# Patient Record
Sex: MALE | Race: WHITE | Employment: OTHER | URBAN - METROPOLITAN AREA
[De-identification: names, ages, dates, MRNs, and addresses within clinical notes are randomized per-mention and may not be internally consistent; named-entity substitution may affect disease eponyms.]

---

## 2020-02-20 ENCOUNTER — HOSPITAL ENCOUNTER (OUTPATIENT)
Dept: CT IMAGING | Age: 70
Discharge: HOME | End: 2020-02-20
Payer: MEDICARE

## 2020-02-20 DIAGNOSIS — M62.830 BACK MUSCLE SPASM: ICD-10-CM

## 2020-02-20 DIAGNOSIS — M40.209 ACQUIRED HUNCHBACK: ICD-10-CM

## 2020-02-20 DIAGNOSIS — M25.78 SPINAL OSTEOPHYTOSIS: ICD-10-CM

## 2020-02-20 DIAGNOSIS — M50.30 DEGENERATION OF CERVICAL INTERVERTEBRAL DISC: ICD-10-CM

## 2020-02-20 PROCEDURE — 72125 CT NECK SPINE W/O DYE: CPT
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
## 2020-03-09 ENCOUNTER — OFFICE VISIT (OUTPATIENT)
Dept: ORTHOPEDIC SURGERY | Age: 70
End: 2020-03-09

## 2020-03-09 VITALS
TEMPERATURE: 98 F | BODY MASS INDEX: 23.76 KG/M2 | OXYGEN SATURATION: 97 % | HEART RATE: 72 BPM | DIASTOLIC BLOOD PRESSURE: 78 MMHG | SYSTOLIC BLOOD PRESSURE: 119 MMHG | HEIGHT: 72 IN | WEIGHT: 175.4 LBS

## 2020-03-09 DIAGNOSIS — M62.838 MUSCLE SPASM: ICD-10-CM

## 2020-03-09 DIAGNOSIS — Z95.0 S/P PLACEMENT OF CARDIAC PACEMAKER: ICD-10-CM

## 2020-03-09 DIAGNOSIS — M48.02 FORAMINAL STENOSIS OF CERVICAL REGION: Primary | ICD-10-CM

## 2020-03-09 DIAGNOSIS — M79.18 MYOFASCIAL PAIN: ICD-10-CM

## 2020-03-09 DIAGNOSIS — M47.812 CERVICAL FACET JOINT SYNDROME: ICD-10-CM

## 2020-03-09 RX ORDER — DEXTROAMPHETAMINE SACCHARATE, AMPHETAMINE ASPARTATE MONOHYDRATE, DEXTROAMPHETAMINE SULFATE AND AMPHETAMINE SULFATE 5; 5; 5; 5 MG/1; MG/1; MG/1; MG/1
20 CAPSULE, EXTENDED RELEASE ORAL DAILY
COMMUNITY
Start: 2020-03-04

## 2020-03-09 RX ORDER — PRAZOSIN HYDROCHLORIDE 5 MG/1
CAPSULE ORAL
COMMUNITY
Start: 2020-01-31

## 2020-03-09 RX ORDER — METHYLPREDNISOLONE 4 MG/1
TABLET ORAL
Qty: 1 DOSE PACK | Refills: 0 | Status: SHIPPED | OUTPATIENT
Start: 2020-03-09 | End: 2020-04-27 | Stop reason: ALTCHOICE

## 2020-03-09 RX ORDER — METHOCARBAMOL 500 MG/1
TABLET, FILM COATED ORAL
Qty: 60 TAB | Refills: 1 | Status: SHIPPED | OUTPATIENT
Start: 2020-03-09 | End: 2020-04-27 | Stop reason: SDUPTHER

## 2020-03-09 RX ORDER — IBUPROFEN 600 MG/1
TABLET ORAL
Qty: 60 TAB | Refills: 1 | Status: SHIPPED | OUTPATIENT
Start: 2020-03-09

## 2020-03-09 NOTE — PATIENT INSTRUCTIONS
Neck Arthritis: Exercises Introduction Here are some examples of exercises for you to try. The exercises may be suggested for a condition or for rehabilitation. Start each exercise slowly. Ease off the exercises if you start to have pain. You will be told when to start these exercises and which ones will work best for you. How to do the exercises Neck stretches to the side 1. This stretch works best if you keep your shoulder down as you lean away from it. To help you remember to do this, start by relaxing your shoulders and lightly holding on to your thighs or your chair. 2. Tilt your head toward your shoulder and hold for 15 to 30 seconds. Let the weight of your head stretch your muscles. 3. Repeat 2 to 4 times toward each shoulder. Chin tuck 1. Lie on the floor with a rolled-up towel under your neck. Your head should be touching the floor. 2. Slowly bring your chin toward your chest. 
3. Hold for a count of 6, and then relax for up to 10 seconds. 4. Repeat 8 to 12 times. Active cervical rotation 1. Sit in a firm chair, or stand up straight. 2. Keeping your chin level, turn your head to the right, and hold for 15 to 30 seconds. 3. Turn your head to the left and hold for 15 to 30 seconds. 4. Repeat 2 to 4 times to each side. Shoulder blade squeeze 1. While standing, squeeze your shoulder blades together. 2. Do not raise your shoulders up as you are squeezing. 3. Hold for 6 seconds. 4. Repeat 8 to 12 times. Shoulder rolls 1. Sit comfortably with your feet shoulder-width apart. You can also do this exercise standing up. 2. Roll your shoulders up, then back, and then down in a smooth, circular motion. 3. Repeat 2 to 4 times. Follow-up care is a key part of your treatment and safety. Be sure to make and go to all appointments, and call your doctor if you are having problems. It's also a good idea to know your test results and keep a list of the medicines you take. Where can you learn more? Go to http://sy-clay.info/. Enter B053 in the search box to learn more about \"Neck Arthritis: Exercises. \" Current as of: June 26, 2019 Content Version: 12.2 © 6968-1133 [employer]. Care instructions adapted under license by [employer] (which disclaims liability or warranty for this information). If you have questions about a medical condition or this instruction, always ask your healthcare professional. [place] [address] any warranty or liability for your use of this information.

## 2020-03-09 NOTE — LETTER
3/10/20 Patient: Amanda Casillas YOB: 1950 Date of Visit: 3/9/2020 Ned Tarango MD 
[address] [place] [address] VIA Facsimile: [phone] Dear Ned Tarango MD, Thank you for referring Mr. Han Brown to [address] [place] [address] for evaluation. My notes for this consultation are attached. If you have questions, please do not hesitate to call me. I look forward to following your patient along with you. Sincerely, Derick Romero MD

## 2020-04-27 ENCOUNTER — VIRTUAL VISIT (OUTPATIENT)
Dept: ORTHOPEDIC SURGERY | Age: 70
End: 2020-04-27

## 2020-04-27 DIAGNOSIS — M62.838 MUSCLE SPASM: ICD-10-CM

## 2020-04-27 DIAGNOSIS — Z95.0 S/P PLACEMENT OF CARDIAC PACEMAKER: ICD-10-CM

## 2020-04-27 DIAGNOSIS — M79.18 MYOFASCIAL PAIN: ICD-10-CM

## 2020-04-27 DIAGNOSIS — M47.812 CERVICAL FACET JOINT SYNDROME: ICD-10-CM

## 2020-04-27 DIAGNOSIS — M48.02 FORAMINAL STENOSIS OF CERVICAL REGION: Primary | ICD-10-CM

## 2020-04-27 NOTE — PROGRESS NOTES
[facility]  [address] [place], [facility] [address]  Phone: [phone]  Fax: [phone]    Pt's YOB: 1950    ASSESSMENT   Diagnoses and all orders for this visit:    1. Foraminal stenosis of cervical region    2. Muscle spasm  -     methocarbamoL (ROBAXIN) 500 mg tablet; Take 1 tab by mouth BID-TID as needed for muscle spasm. 3. Cervical facet joint syndrome    4. Myofascial pain    5. S/P placement of cardiac pacemaker         IMPRESSION AND PLAN:  Jasper Guan is a 77 y.o. right hand dominant male with history of cervical pain. Pt reports intermittent dull neck pain but notes improvement since he took a Medrol Dosepak. He was unable to attend physical therapy due to COVID-19 precautions. Pt takes ibuprofen 600 mg and Robaxin 500 mg as needed with benefit    1) Pt was given information on cervical arthritis exercises. 2) He will continue taking ibuprofen as prescribed and does not need a refill at this time-- he uses this intermittently as needed due to history of gi bleed. 3) Pt received a refill of Robaxin 500 mg 1 tab BID-TID prn muscle spasm. 4) Discussed restarting physical therapy once facilities reopen. 5) Mr. Stanislav Shell has a reminder for a \"due or due soon\" health maintenance. I have asked that he contact his primary care provider, Jessica Greenfield MD, for follow-up on this health maintenance. 6)  demonstrated consistency with prescribing. Follow-up and Dispositions    · Return in about 4 weeks (around 5/25/2020) for Medication follow up virtual visit to set up therapy. CPT Codes 28617-83034 for Established Patients may apply to this TeleHealth Visit  Time-based coding, delete if not needed: I spent at least 15 minutes with this established patient, and >50% of the time was spent counseling and/or coordinating care regarding his symptoms, physical therapy, and medications.      Due to this being a TeleHealth evaluation, many elements of the physical examination are unable to be assessed. Pursuant to the emergency declaration under the [facility], [facility] waiver authority and the [employer] Act, this Virtual Visit was conducted, with patient's consent, to reduce the patient's risk of exposure to COVID-19 and provide continuity of care for an established patient. Services were provided through a video synchronous discussion virtually to substitute for in-person clinic visit. HISTORY OF PRESENT ILLNESS:  Trinidad Donovan is a 77 y.o. right hand dominant male with history of cervical pain and is seen from his home by synchronous (real-time) audio-video technology at the [facility] location via doxy. me on 4/27/2020 with his verbal consent for follow up. Pt reports intermittent dull neck pain but notes improvement since he took a Medrol Dosepak. He denies any pain extending onto the arms at this time. Pt reports that he previously experienced burning lava like pain radiating down the arms and legs after increased activity but this has recently improved. He attended the evaluation appointment for physical therapy but was never able to start sessions since his last office visit due to COVID-19 precautions. Pt notes that he purchased a TheraCane since his last office visit. Pt takes ibuprofen 600 mg and Robaxin 500 mg as needed with benefit. He denies any sedation with his medications. Pt reports a history of 2 bleeding ulcers so he generally avoids taking the ibuprofen as tolerated. Of note, he is not a candidate for an MRI since he has a pacemaker. Pt at this time desires to continue with current care.      Pain Scale: 1/10    PCP: Leila Panchal MD     Past Medical History:   Diagnosis Date    Abdominal bloating     Anxiety     Benign prostatic hyperplasia with lower urinary tract symptoms     BPH with obstruction/lower urinary tract symptoms     BPH without obstruction/lower urinary tract symptoms     Bradycardia     Cardiac arrhythmia     Depression     Dysuria     ED (erectile dysfunction)     Elevated PSA     Erectile dysfunction of organic origin     Fecal incontinence due to anorectal disorder     Glaucoma 2012    H/O hernia repair     Heart attack ([facility] [address]     2014    Hematuria     Hernia, umbilical     History of shingles 1985    Hypercholesterolemia     Incomplete bladder emptying     MS (multiple sclerosis) ([facility])     Nonvenomous insect bite of penis     OAB (overactive bladder)     Other urethral stricture, male, meatal     Peyronie disease     Retinal tear 2012    both eyes    Short-term memory loss     Urinary hesitancy     Urinary incontinence     Urinary retention     Urinary stream slowing     Weak urine stream         Social History     Socioeconomic History    Marital status: UNKNOWN     Spouse name: Not on file    Number of children: Not on file    Years of education: Not on file    Highest education level: Not on file   Occupational History    Not on file   Social Needs    Financial resource strain: Not on file    Food insecurity     Worry: Not on file     Inability: Not on file   [employer] needs     Medical: Not on file     Non-medical: Not on file   Tobacco Use    Smoking status: Former Smoker     Years: 1.00     Types: Cigarettes     Last attempt to quit: 1971     Years since quittin.3    Smokeless tobacco: Never Used   Substance and Sexual Activity    Alcohol use: Yes     Alcohol/week: 0.0 standard drinks     Comment: Occassionally.      Drug use: No    Sexual activity: Yes   Lifestyle    Physical activity     Days per week: Not on file     Minutes per session: Not on file    Stress: Not on file   Relationships    Social connections     Talks on phone: Not on file     Gets together: Not on file     Attends Yarsanism service: Not on file Active member of club or organization: Not on file     Attends meetings of clubs or organizations: Not on file     Relationship status: Not on file    Intimate partner violence     Fear of current or ex partner: Not on file     Emotionally abused: Not on file     Physically abused: Not on file     Forced sexual activity: Not on file   Other Topics Concern    Not on file   Social History Narrative    Not on file       Current Outpatient Medications   Medication Sig Dispense Refill    methocarbamoL (ROBAXIN) 500 mg tablet Take 1 tab by mouth BID-TID as needed for muscle spasm. 60 Tab 1    amphetamine-dextroamphetamine XR (ADDERALL XR) 20 mg XR capsule Take 20 mg by mouth daily.  prazosin (MINIPRESS) 5 mg capsule TK 1 C PO HS      ibuprofen (MOTRIN) 600 mg tablet Take 1 tab by mouth two (2) times daily as needed with food 60 Tab 1    sildenafil, pulm. hypertension, (REVATIO) 20 mg tablet Take 1-5 tabs by mouth one hour prior to sexual activity 90 Tab 0    fish oil-omega-3 fatty acids 340-1,000 mg capsule Take 1 Cap by mouth daily.  DULoxetine (CYMBALTA) 60 mg capsule Take 60 mg by mouth daily. No Known Allergies      REVIEW OF SYSTEMS    Constitutional: Negative for fever, chills, or weight change. Gastrointestinal: Negative for acid reflux, change in bowel habits, or constipation. Musculoskeletal: Positive for cervical pain. Neurological: Negative for headaches, dizziness, or increased numbness. Psychiatric/Behavioral: Positive for difficulty with sleep. IMAGING:    Cervical spine CT from 02/20/2020 was personally reviewed with the patient and demonstrated:       Results from [facility] on 02/20/20   CT SPINE CERV WO CONT     Narrative EXAM: CT SPINE CERV WO CONT     CLINICAL INDICATION/HISTORY: Spinal osteophytosis     COMPARISON: None.     TECHNIQUE: CT of the cervical spine without intravenous contrast administration.    Coronal and sagittal reformats were generated and reviewed. One or more dose  reduction techniques were used on this CT: automated exposure control,  adjustment of the mAs and/or kVp according to patient size, and iterative  reconstruction techniques. The specific techniques used on this CT exam have  been documented in the patient's electronic medical record. Digital Imaging and  Communications in Medicine (DICOM) format image data are available to  nonaffiliated external healthcare facilities or entities on a secure, media  free, reciprocally searchable basis with patient authorization for at least a  12-month period after this study.        _______________        FINDINGS:     VERTEBRAE AND DISCS: Straightening of the normal cervical lordosis. Multilevel  degenerative disc disease with endplate sclerosis and spurring, most prominent  at C5-C6 and C6-C7. Mild multilevel facet arthrosis is also noted. Vertebral  body heights are preserved. The dens and atlanto-axial axis are intact. The  posterior elements are intact. No fracture or subluxation. No aggressive  osseous lesions.     SPINAL CANAL AND FORAMINA:     The spinal canal and neural foramina are maintained at C2-C3.     C3-C4: Uncovertebral hypertrophy and facet arthropathy to the left results in  moderate to high-grade foraminal encroachment. Canal and right neural foramina  are patent.     C4-C5: Combination no significant canal or facet of facet arthrosis and  uncovertebral hypertrophy results in mild bilateral foraminal encroachment. Canal is maintained.     C5-C6: Uncovertebral hypertrophy and facet arthrosis contribute to mild  foraminal encroachment bilaterally.     C6-C7: Uncovertebral hypertrophy and facet arthrosis result in moderate right  greater than left foraminal encroachment.  No high-grade canal stenosis.     C7-T1: No significant canal or foraminal encroachment.     PREVERTEBRAL SOFT TISSUES: Normal.     VISIBLE PORTIONS OF POSTERIOR FOSSA/BRAIN: Unremarkable.     LUNG APICES: Unremarkable.     OTHER: None.     _______________        Impression IMPRESSION:     Multilevel cervical spondylosis, most significant at C3-C4 where there is  moderate to high-grade left-sided foraminal encroachment, possible source of C4  nerve impingement. No acute findings or other significant areas of stenosis. Written by Lisbet De Oliveira, as dictated by Reginald Styles MD.  I, Dr. Reginald Styles confirm that all documentation is accurate.

## 2020-04-27 NOTE — PATIENT INSTRUCTIONS
Neck Arthritis: Exercises Introduction Here are some examples of exercises for you to try. The exercises may be suggested for a condition or for rehabilitation. Start each exercise slowly. Ease off the exercises if you start to have pain. You will be told when to start these exercises and which ones will work best for you. How to do the exercises Neck stretches to the side 1. This stretch works best if you keep your shoulder down as you lean away from it. To help you remember to do this, start by relaxing your shoulders and lightly holding on to your thighs or your chair. 2. Tilt your head toward your shoulder and hold for 15 to 30 seconds. Let the weight of your head stretch your muscles. 3. Repeat 2 to 4 times toward each shoulder. Chin tuck 1. Lie on the floor with a rolled-up towel under your neck. Your head should be touching the floor. 2. Slowly bring your chin toward your chest. 
3. Hold for a count of 6, and then relax for up to 10 seconds. 4. Repeat 8 to 12 times. Active cervical rotation 1. Sit in a firm chair, or stand up straight. 2. Keeping your chin level, turn your head to the right, and hold for 15 to 30 seconds. 3. Turn your head to the left and hold for 15 to 30 seconds. 4. Repeat 2 to 4 times to each side. Shoulder blade squeeze 1. While standing, squeeze your shoulder blades together. 2. Do not raise your shoulders up as you are squeezing. 3. Hold for 6 seconds. 4. Repeat 8 to 12 times. Shoulder rolls 1. Sit comfortably with your feet shoulder-width apart. You can also do this exercise standing up. 2. Roll your shoulders up, then back, and then down in a smooth, circular motion. 3. Repeat 2 to 4 times. Follow-up care is a key part of your treatment and safety. Be sure to make and go to all appointments, and call your doctor if you are having problems. It's also a good idea to know your test results and keep a list of the medicines you take. Where can you learn more? Go to http://sy-clay.info/ Enter A846 in the search box to learn more about \"Neck Arthritis: Exercises. \" Current as of: June 26, 2019Content Version: 12.4 © 1224-1844 [employer]. Care instructions adapted under license by [employer] (which disclaims liability or warranty for this information). If you have questions about a medical condition or this instruction, always ask your healthcare professional. [address] any warranty or liability for your use of this information.

## 2020-04-28 RX ORDER — METHOCARBAMOL 500 MG/1
TABLET, FILM COATED ORAL
Qty: 60 TAB | Refills: 1 | Status: SHIPPED | OUTPATIENT
Start: 2020-04-28

## 2020-06-11 ENCOUNTER — HOSPITAL ENCOUNTER (OUTPATIENT)
Dept: PHYSICAL THERAPY | Age: 70
Discharge: HOME | End: 2020-06-11
Payer: MEDICARE

## 2020-06-11 PROCEDURE — 97162 PT EVAL MOD COMPLEX 30 MIN: CPT

## 2020-06-11 PROCEDURE — 97110 THERAPEUTIC EXERCISES: CPT

## 2020-06-11 NOTE — PROGRESS NOTES
PHYSICAL THERAPY - DAILY TREATMENT NOTE    Patient Name: Trinidad Donovan        Date: 2020  : 1950   YES Patient  Verified  Visit #:   1     Insurance: Payor: [address] [place] [address] / Plan: [facility] MEDICARE COMPLETE / Product Type: Managed Care Medicare /      In time: 1:15 Out time: 2:08   Total Treatment Time: 53     Medicare Time /BCBS Tracking (below)   Total Timed Codes (min):  10 1:1 Treatment Time:  10     TREATMENT AREA =  Neck pain [M54.2]    SUBJECTIVE  Pain Level (on 0 to 10 scale):  2-3  / 10   Medication Changes/New allergies or changes in medical history, any new surgeries or procedures? NO    If yes, update Summary List   Subjective Functional Status/Changes:  []  No changes reported     See medical history          OBJECTIVE  Modalities Rationale:     decrease pain and increase tissue extensibility to improve patient's ability to look up and turn head   min [] Estim, type/location:                                      []  att     []  unatt     []  w/US     []  w/ice    []  w/heat    min []  Mechanical Traction: type/lbs                   []  pro   []  sup   []  int   []  cont    []  before manual    []  after manual    min []  Ultrasound, settings/location:      min []  Iontophoresis w/ dexamethasone, location:                                               []  take home patch       []  in clinic   10 min []  Ice     [x]  Heat    location/position: Supine/ C/S    min []  Vasopneumatic Device, press/temp:     min []  Other:    [x] Skin assessment post-treatment (if applicable):    [x]  intact    []  redness- no adverse reaction     []redness  adverse reaction:          10 min Patient Education:  YES  Reviewed/POC/Goals   []  Progressed/Changed HEP based on:  Patient came in with his theracane. Educated in proper use. Educated on cervical arthritis.      Other Objective/Functional Measures:    See POC   Post Treatment Pain Level (on 0 to 10) scale:   2-3  / 10 ASSESSMENT  Assessment/Changes in Function:     Justification for Eval Code Complexity:  Patient History : See POC  Examination see exam   Clinical Presentation: evolving  Clinical Decision Making : FOTO : 51 /100         []  See Progress Note/Recertification   Patient will continue to benefit from skilled PT services to modify and progress therapeutic interventions, address functional mobility deficits, address ROM deficits, analyze and address soft tissue restrictions, assess and modify postural abnormalities and instruct in home and community integration to attain remaining goals. Progress toward goals / Updated goals:    Initial evaluation completed with education initiated.        PLAN  [x]  Upgrade activities as tolerated YES Continue plan of care   []  Discharge due to :    []  Other:      Therapist: Janet Holland PT    Date: 6/11/2020 Time: 2:08 PM     Future Appointments   Date Time Provider Monica Alonzo   6/18/2020  1:45 PM Surya Cool PT [facility] [address]   6/25/2020  8:45 AM Surya Cool PT [facility] [address]   6/30/2020 11:45 AM Hair Chang, [facility] [address]
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
## 2020-06-18 ENCOUNTER — HOSPITAL ENCOUNTER (OUTPATIENT)
Dept: PHYSICAL THERAPY | Age: 70
Discharge: HOME | End: 2020-06-18
Payer: MEDICARE

## 2020-06-18 PROCEDURE — 97110 THERAPEUTIC EXERCISES: CPT

## 2020-06-18 PROCEDURE — 97140 MANUAL THERAPY 1/> REGIONS: CPT

## 2020-06-18 PROCEDURE — 97012 MECHANICAL TRACTION THERAPY: CPT

## 2020-06-18 NOTE — PROGRESS NOTES
PHYSICAL THERAPY - DAILY TREATMENT NOTE    Patient Name: Stephanie Moody        Date: 2020  : 1950   YES Patient  Verified  Visit #:   1     Insurance: Payor: Amna Garduno / Plan: [employer] MEDIC[employer] COMPLETE / Product Type: Managed Care Medicare /      In time: 1:55 Out time: 2:39   Total Treatment Time: 44     Medicare Time /BCBS Tracking (below)   Total Timed Codes (min):  34 1:1 Treatment Time:  34     TREATMENT AREA =  Neck pain [M54.2]    SUBJECTIVE  Pain Level (on 0 to 10 scale):  1 / 10   Medication Changes/New allergies or changes in medical history, any new surgeries or procedures? NO    If yes, update Summary List   Subjective Functional Status/Changes:  []  No changes reported     Pain increases after speed walking or mowing the lawn.          OBJECTIVE  Modalities Rationale:     decrease pain and increase tissue extensibility to improve patient's ability to look up and turn head   min [] Estim, type/location:                                      []  att     []  unatt     []  w/US     []  w/ice    []  w/heat   10 min [x]  Mechanical Traction: type/lbs C/S supine with moist heat                  []  pro   []  sup   []  int   [x]  cont    []  before manual    [x]  after manual    min []  Ultrasound, settings/location:      min []  Iontophoresis w/ dexamethasone, location:                                               []  take home patch       []  in clinic    min []  Ice     [x]  Heat    location/position: Supine/ C/S    min []  Vasopneumatic Device, press/temp:     min []  Other:    [x] Skin assessment post-treatment (if applicable):    [x]  intact    []  redness- no adverse reaction     []redness  adverse reaction:        24 min Therapeutic Exercise:  [x]  See flow sheet   Rationale:      increase ROM and increase strength to improve the patients ability to perform functional ADLs    10 min Manual Therapy: Release/DTM bilateral UT and levator scapula muscles Rationale:      decrease pain, increase ROM and increase tissue extensibility to improve patient's ability to turn l     min Patient Education:  YES  Reviewed/POC/Goals   []  Progressed/Changed HEP based on:  C/S isometrics     Other Objective/Functional Measures:    Began therapeutic exercise, cervical traction, manual therapy    Post Treatment Pain Level (on 0 to 10) scale:   0 / 10     ASSESSMENT  Assessment/Changes in Function:     Patient reported decreased pain following traction. HEP initiated. []  See Progress Note/Recertification   Patient will continue to benefit from skilled PT services to modify and progress therapeutic interventions, address functional mobility deficits, address ROM deficits, analyze and address soft tissue restrictions, assess and modify postural abnormalities and instruct in home and community integration to attain remaining goals.    Progress toward goals / Updated goals:    First treatment after eval.       PLAN  [x]  Upgrade activities as tolerated YES Continue plan of care   []  Discharge due to :    []  Other:      Therapist: Bernarda Hester, PT    Date: 6/18/2020 Time: 2:39 PM     Future Appointments   Date Time Provider Monica Alonzo   6/25/2020  8:45 AM Stephany Dawn PT [facility]   6/30/2020 11:45 AM James Mcgraw

## 2020-06-25 ENCOUNTER — APPOINTMENT (OUTPATIENT)
Dept: PHYSICAL THERAPY | Age: 70
End: 2020-06-25
Payer: MEDICARE

## 2020-06-30 ENCOUNTER — APPOINTMENT (OUTPATIENT)
Dept: PHYSICAL THERAPY | Age: 70
End: 2020-06-30
Payer: MEDICARE

## 2020-09-03 NOTE — PROGRESS NOTES
[facility] PHYSICAL THERAPY  [address] [place] [address] - Phone: [phone]  Fax: [phone] SUMMARY FOR PHYSICAL THERAPY          Patient Name: Loretta Mchugh : 1950   Treatment/Medical Diagnosis: Neck pain [M54.2]   Onset Date: 2019    Referral Source: Rayshawn Rodriguez MD [facility]): 2020   Prior Hospitalization: See Medical History Provider #: [id number]   Prior Level of Function: Intermittent neck pain   Comorbidities: Pacemaker, Myelin degeneration, MS   Medications: Verified on Patient Summary List   Visits from [facility]: 2 Missed Visits: 2     Goal/Measure of Progress Goal Met? 1.  patient performing daily HEP and self massage via theracane. .    Status at last Eval: na Current Status: Unable to reassess no   2. Patient will report 20% improvement in symptoms with ADLs such as turning head. Status at last Eval: na Current Status: Unable to reassess no   3. Improve C/S AROM by 5 degrees all planes to facilitate ADLs such as looking up   Status at last Eval:  C/S AROM as follows; flexion 28, extension 32, right side bend 35, left side bend 25, right rotation 40, left rotation 35 Current Status: Unable to reassess no   Key Functional Changes/Progress: Patient completed 2/8 treatments then did not continue PT secondary to being out of town for several months. G-Codes (GP): na  Assessments/Recommendations: Discontinue therapy due to lack of attendance or compliance. If you have any questions/comments please contact us directly at  [phone]. Thank you for allowing us to assist in the care of your patient. Therapist Signature:  Lenard Powell, PT Date: 2020   Reporting Period: 2020 to 2020 Time: 10:05 AM

## 2021-04-12 PROBLEM — R55 NEAR SYNCOPE: Status: ACTIVE | Noted: 2020-10-25
